# Patient Record
Sex: FEMALE | Race: OTHER
[De-identification: names, ages, dates, MRNs, and addresses within clinical notes are randomized per-mention and may not be internally consistent; named-entity substitution may affect disease eponyms.]

---

## 2017-04-03 ENCOUNTER — HOSPITAL ENCOUNTER (EMERGENCY)
Dept: HOSPITAL 76 - ED | Age: 22
Discharge: HOME | End: 2017-04-03
Payer: COMMERCIAL

## 2017-04-03 VITALS — SYSTOLIC BLOOD PRESSURE: 124 MMHG | DIASTOLIC BLOOD PRESSURE: 78 MMHG

## 2017-04-03 DIAGNOSIS — J06.9: Primary | ICD-10-CM

## 2017-04-03 DIAGNOSIS — R07.9: ICD-10-CM

## 2017-04-03 PROCEDURE — 99283 EMERGENCY DEPT VISIT LOW MDM: CPT

## 2017-04-03 PROCEDURE — 71020: CPT

## 2017-04-03 PROCEDURE — 99282 EMERGENCY DEPT VISIT SF MDM: CPT

## 2017-04-03 RX ADMIN — IBUPROFEN STA MG: 600 TABLET, FILM COATED ORAL at 18:03

## 2017-04-03 RX ADMIN — ACETAMINOPHEN STA MG: 325 TABLET ORAL at 18:02

## 2017-04-03 NOTE — XRAY REPORT
EXAM:

CHEST RADIOGRAPHY

 

EXAM DATE: 4/3/2017 06:10 PM.

 

CLINICAL HISTORY: Cough and left chest pain.

 

COMPARISON: None.

 

TECHNIQUE: 2 views.

 

FINDINGS: 

Lungs/Pleura: No focal opacities evident. No pleural effusion. No pneumothorax. Normal volumes.

 

Mediastinum: Heart and mediastinal contours are unremarkable.

 

Other: None.

 

IMPRESSION: 

1. No consolidation. 

2. No pneumothorax or effusion.

 

[facility]

Referring Provider Line: [phone]

 

SITE ID: [id number]

## 2017-04-03 NOTE — XRAY PRELIMINARY REPORT
Accession: [id number]

Exam: XR Chest 2 View PA/LAT

 

IMPRESSION: 

1. No consolidation. 

2. No pneumothorax or effusion.

 

[facility]

 

SITE ID: [id number]

## 2017-04-03 NOTE — ED PHYSICIAN DOCUMENTATION
PD HPI URI





- Stated complaint


Stated Complaint: CHEST PX,COUGH,RASH,





- Chief complaint


Chief Complaint: General





- History obtained from


History obtained from: Patient





- History of Present Illness


Timing - onset: How many weeks ago (1-2)


Timing duration: Weeks


Timing details: Gradual onset, Still present (worse past few days with chest 

pain too)


Associated symptoms: Fever, Chills, Productive cough, Chest pain, Dyspnea.  No: 

Hemoptysis, NVD


Contributing factors: No: Travel, COPD / asthma


Improves by: Rest


Worsened by: Breathing


Similar symptoms before: Has not had sx before


Recently seen: Not recently seen





Review of Systems


Constitutional: reports: Fever, Chills, Myalgias


Nose: reports: Congestion


Respiratory: reports: Dyspnea, Cough, Wheezing


GI: denies: Vomiting, Diarrhea


Skin: denies: Rash





PD PAST MEDICAL HISTORY





- Past Medical History


Past Medical History: No


Cardiovascular: None


Respiratory: None





- Present Medications


Home Medications: 


 Ambulatory Orders











 Medication  Instructions  Recorded  Confirmed


 


Albuterol Sulfate [Proair Hfa 2 puffs IH QID #1 hfa.aer.ad 04/03/17 





Inhaler]   


 


Azithromycin [Zithromax] 250 mg PO DAILY #6 tablet 04/03/17 


 


Dexamethasone [Decadron] 4 mg PO DAILY #5 tablet 04/03/17 


 


Hydrocodone/Acetaminophen [Norco 1 each PO Q6H PRN #20 tablet 04/03/17 





5-325 Tablet]   














- Allergies


Allergies/Adverse Reactions: 


 Allergies











Allergy/AdvReac Type Severity Reaction Status Date / Time


 


No Known Drug Allergies Allergy   Verified 04/03/17 16:16














PD ED PE NORMAL





- Vitals


Vital signs reviewed: Yes





- General


General: Alert and oriented X 3, No acute distress, Well developed/nourished





- HEENT


HEENT: Ears normal, Pharynx benign





- Neck


Neck: Supple, no meningeal sign, No adenopathy





- Cardiac


Cardiac: RRR, No murmur





- Respiratory


Respiratory: No: Clear bilaterally (scattered wheezes)





- Derm


Derm: Normal color, Warm and dry





- Extremities


Extremities: No edema, No calf tenderness / cord





Results





- Vitals


Vitals: 


 Vital Signs - 24 hr











  04/03/17 04/03/17





  16:13 18:29


 


Temperature 36.6 C 37.4 C


 


Heart Rate 97 80


 


Respiratory 14 16





Rate  


 


Blood Pressure 121/72 124/78


 


O2 Saturation 95 99








 Oxygen











O2 Source                      Room air

















- Rads (name of study)


  ** chest


Radiology: Prelim report reviewed, EMP read contemporaneously (normal)





PD MEDICAL DECISION MAKING





- ED course


Complexity details: considered differential (has been sick awhile and worse 

cough with sputum, consider bacterial transformation.), d/w patient





Departure





- Departure


Disposition: 01 Home, Self Care


Clinical Impression: 


Upper respiratory infection


Qualifiers:


 URI type: unspecified URI Qualified Code(s): J06.9 - Acute upper respiratory 

infection, unspecified





Chest pain


Qualifiers:


 Chest pain type: chest pain on breathing Qualified Code(s): R07.1 - Chest pain 

on breathing


Condition: Stable


Record reviewed to determine appropriate education?: Yes


Instructions:  ED Chest Pain Pleurisy, ED Upper Resp Infec Abx Tx


Follow-Up: 


SERA GREENBERG [Primary Care Provider] - 


Prescriptions: 


Dexamethasone [Decadron] 4 mg PO DAILY #5 tablet


Hydrocodone/Acetaminophen [Norco 5-325 Tablet] 1 each PO Q6H PRN #20 tablet


 PRN Reason: Pain


Albuterol Sulfate [Proair Hfa Inhaler] 2 puffs IH QID #1 hfa.aer.ad


Azithromycin [Zithromax] 250 mg PO DAILY #6 tablet


Comments: 


Your chest xray looks okay, so no pneumonia, collapsed lung, etc. Sounds like 

bronchitis and some inflammation of the chestwall/lung surface. Zithromax and 

Decadron daily for 5 days. Use Albuterol inhaler 2 puffs 4 times daily for 7-10 

days. Add hydrocodone as needed for pains. Recheck if not improved over the 

next 3-5 days. 


Forms:  Activity restrictions


Discharge Date/Time: 04/03/17 18:58

## 2017-05-16 ENCOUNTER — HOSPITAL ENCOUNTER (EMERGENCY)
Dept: HOSPITAL 76 - ED | Age: 22
Discharge: HOME | End: 2017-05-16
Payer: COMMERCIAL

## 2017-05-16 VITALS — DIASTOLIC BLOOD PRESSURE: 76 MMHG | SYSTOLIC BLOOD PRESSURE: 120 MMHG

## 2017-05-16 DIAGNOSIS — F17.200: ICD-10-CM

## 2017-05-16 DIAGNOSIS — R14.0: Primary | ICD-10-CM

## 2017-05-16 DIAGNOSIS — R03.0: ICD-10-CM

## 2017-05-16 DIAGNOSIS — R53.83: ICD-10-CM

## 2017-05-16 LAB
ALBUMIN/GLOB SERPL: 1.2 {RATIO} (ref 1–2.2)
ANION GAP SERPL CALCULATED.4IONS-SCNC: 8 MMOL/L (ref 6–13)
BASOPHILS NFR BLD AUTO: 0 10^3/UL (ref 0–0.1)
BASOPHILS NFR BLD AUTO: 0.7 %
BILIRUB BLD-MCNC: 0.4 MG/DL (ref 0.2–1)
BUN SERPL-MCNC: 11 MG/DL (ref 6–20)
CALCIUM UR-MCNC: 9.5 MG/DL (ref 8.5–10.3)
CHLORIDE SERPL-SCNC: 103 MMOL/L (ref 101–111)
CO2 SERPL-SCNC: 27 MMOL/L (ref 21–32)
CREAT SERPLBLD-SCNC: 0.6 MG/DL (ref 0.4–1)
CUL URINE ADD CHARGE: (no result)
EOSINOPHIL # BLD AUTO: 0 10^3/UL (ref 0–0.7)
EOSINOPHIL NFR BLD AUTO: 0.7 %
ERYTHROCYTE [DISTWIDTH] IN BLOOD BY AUTOMATED COUNT: 13.5 % (ref 12–15)
GFRSERPLBLD MDRD-ARVRAT: 126 ML/MIN/{1.73_M2} (ref 89–?)
GLOBULIN SER-MCNC: 3.7 G/DL (ref 2.1–4.2)
GLUCOSE SERPL-MCNC: 102 MG/DL (ref 70–100)
HCG UR QL: NEGATIVE
HCT VFR BLD AUTO: 42.7 % (ref 37–47)
HGB UR QL STRIP: 14.1 G/DL (ref 12–16)
HPYLORI POS QC: POSITIVE
LIPASE SERPL-CCNC: 32 U/L (ref 22–51)
LYMPHOCYTES # SPEC AUTO: 2.3 10^3/UL (ref 1.5–3.5)
LYMPHOCYTES NFR BLD AUTO: 35.3 %
MCH RBC QN AUTO: 32.3 PG (ref 27–31)
MCHC RBC AUTO-ENTMCNC: 33 G/DL (ref 32–36)
MCV RBC AUTO: 97.9 FL (ref 81–99)
MONOCYTES # BLD AUTO: 0.5 10^3/UL (ref 0–1)
MONOCYTES NFR BLD AUTO: 7.1 %
NEUTROPHILS # BLD AUTO: 3.6 10^3/UL (ref 1.5–6.6)
NEUTROPHILS # SNV AUTO: 6.5 X10^3/UL (ref 4.8–10.8)
NEUTROPHILS NFR BLD AUTO: 56.2 %
NRBC # BLD AUTO: 0 /100WBC
PDW BLD AUTO: 9.3 FL (ref 7.9–10.8)
PH UR STRIP.AUTO: 6.5 PH (ref 5–7.5)
POTASSIUM SERPL-SCNC: 4.1 MMOL/L (ref 3.5–5)
PROT SPEC-MCNC: 8.2 G/DL (ref 6.7–8.2)
RBC MAR: 4.36 10^6/UL (ref 4.2–5.4)
SODIUM SERPLBLD-SCNC: 138 MMOL/L (ref 135–145)
SP GR UR STRIP.AUTO: 1.01 (ref 1–1.03)
SP GR UR STRIP.AUTO: 1.01 (ref 1–1.03)
UA CHARGE (STRIP ONLY): (no result)
UA W/ MICROSCOPIC CHARGE: YES
UR CULTURE IF IND: (no result)
UROBILINOGEN UR STRIP.AUTO-MCNC: NEGATIVE MG/DL
WBC # BLD: 6.5 X10^3/UL
WBC # UR MANUAL: (no result) /HPF (ref 0–5)

## 2017-05-16 PROCEDURE — 81003 URINALYSIS AUTO W/O SCOPE: CPT

## 2017-05-16 PROCEDURE — 87077 CULTURE AEROBIC IDENTIFY: CPT

## 2017-05-16 PROCEDURE — 81025 URINE PREGNANCY TEST: CPT

## 2017-05-16 PROCEDURE — 85025 COMPLETE CBC W/AUTO DIFF WBC: CPT

## 2017-05-16 PROCEDURE — 87086 URINE CULTURE/COLONY COUNT: CPT

## 2017-05-16 PROCEDURE — 80053 COMPREHEN METABOLIC PANEL: CPT

## 2017-05-16 PROCEDURE — 83690 ASSAY OF LIPASE: CPT

## 2017-05-16 PROCEDURE — 99283 EMERGENCY DEPT VISIT LOW MDM: CPT

## 2017-05-16 PROCEDURE — 81001 URINALYSIS AUTO W/SCOPE: CPT

## 2017-05-16 PROCEDURE — 87339 H PYLORI AG IA: CPT

## 2017-05-16 NOTE — ED PHYSICIAN DOCUMENTATION
History of Present Illness





- Stated complaint


Stated Complaint: DIZZINESS,FATIGUE





- Chief complaint


Chief Complaint: General





- History obtained from


History obtained from: Patient





- History of Present Illness


Timing: Other (21-year-old who for the last many months has had postprandial 

bloating and sometimes vomiting associated with fatigue despite good sleep.  

Already saw her flight surgeon a week ago and had blood work done, the results 

are unknown.  She denies weight loss, changes in bowel movements, possibility 

of pregnancy (she is on Depo-Provera).)





Review of Systems


Constitutional: reports: Fatigue.  denies: Fever, Chills, Myalgias, Weight Loss

, Sweats


Eyes: denies: Loss of vision, Decreased vision


Nose: denies: Rhinorrhea / runny nose, Congestion


Cardiac: denies: Chest pain / pressure, Palpitations


Respiratory: denies: Dyspnea, Cough





PD PAST MEDICAL HISTORY





- Past Medical History


Past Medical History: No


Cardiovascular: None


Respiratory: None





- Past Surgical History


Past Surgical History: No





- Present Medications


Home Medications: 


 Ambulatory Orders











 Medication  Instructions  Recorded  Confirmed


 


Omeprazole [PriLOSEC] 20 mg PO DAILY #14 capsule 05/16/17 














- Allergies


Allergies/Adverse Reactions: 


 Allergies











Allergy/AdvReac Type Severity Reaction Status Date / Time


 


No Known Drug Allergies Allergy   Verified 05/16/17 19:16














- Social History


Does the pt smoke?: Yes


Smoking Status: Current some day smoker


Does the pt drink ETOH?: No


Does the pt have substance abuse?: No





- Immunizations


Immunizations are current?: Yes





- POLST


Patient has POLST: No





PD ED PE NORMAL





- Vitals


Vital signs reviewed: Yes





- General


General: Alert and oriented X 3, No acute distress





- HEENT


HEENT: PERRL, EOMI





- Neck


Neck: Supple, no meningeal sign, No bony TTP





- Cardiac


Cardiac: RRR, No murmur





- Respiratory


Respiratory: No respiratory distress, Clear bilaterally





- Abdomen


Abdomen: Normal bowel sounds, Soft, Non tender





- Back


Back: No CVA TTP, No spinal TTP





- Extremities


Extremities: No edema, No calf tenderness / cord





- Neuro


Neuro: Alert and oriented X 3, Normal speech





- Psych


Psych: Normal mood, Normal affect





Results





- Vitals


Vitals: 


 Vital Signs - 24 hr











  05/16/17





  19:10


 


Temperature 36.4 C L


 


Heart Rate 92


 


Respiratory 16





Rate 


 


Blood Pressure 142/81 H


 


O2 Saturation 98








 Oxygen











O2 Source                      Room air

















- Labs


Labs: 


 Laboratory Tests











  05/16/17 05/16/17 05/16/17





  19:56 19:56 19:56


 


WBC  6.5  


 


RBC  4.36  


 


Hgb  14.1  


 


Hct  42.7  


 


MCV  97.9  


 


MCH  32.3 H  


 


MCHC  33.0  


 


RDW  13.5  


 


Plt Count  241  


 


MPV  9.3  


 


Neut #  3.6  


 


Lymph #  2.3  


 


Mono #  0.5  


 


Eos #  0.0  


 


Baso #  0.0  


 


Absolute Nucleated RBC  0.00  


 


Nucleated RBCs  0.0  


 


Sodium    138


 


Potassium    4.1


 


Chloride    103


 


Carbon Dioxide    27


 


Anion Gap    8.0


 


BUN    11


 


Creatinine    0.6


 


Estimated GFR (MDRD)    126


 


Glucose    102 H


 


Calcium    9.5


 


Total Bilirubin    0.4


 


AST    43 H


 


ALT    29


 


Alkaline Phosphatase    82


 


Total Protein    8.2


 


Albumin    4.5


 


Globulin    3.7


 


Albumin/Globulin Ratio    1.2


 


Lipase    32


 


Urine Color   


 


Urine Clarity   


 


Urine pH   


 


Ur Specific Gravity   


 


Urine Protein   


 


Urine Glucose (UA)   


 


Urine Ketones   


 


Urine Occult Blood   


 


Urine Nitrite   


 


Urine Bilirubin   


 


Urine Urobilinogen   


 


Ur Leukocyte Esterase   


 


Urine RBC   


 


Urine WBC   


 


Ur Squamous Epith Cells   


 


Urine Bacteria   


 


Urine Mucus   


 


Ur Microscopic Review   


 


Urine Culture Comments   


 


Urine HCG, Qual   


 


H. pylori IgG Antibody   Negative 














  05/16/17 05/16/17





  20:31 20:31


 


WBC  


 


RBC  


 


Hgb  


 


Hct  


 


MCV  


 


MCH  


 


MCHC  


 


RDW  


 


Plt Count  


 


MPV  


 


Neut #  


 


Lymph #  


 


Mono #  


 


Eos #  


 


Baso #  


 


Absolute Nucleated RBC  


 


Nucleated RBCs  


 


Sodium  


 


Potassium  


 


Chloride  


 


Carbon Dioxide  


 


Anion Gap  


 


BUN  


 


Creatinine  


 


Estimated GFR (MDRD)  


 


Glucose  


 


Calcium  


 


Total Bilirubin  


 


AST  


 


ALT  


 


Alkaline Phosphatase  


 


Total Protein  


 


Albumin  


 


Globulin  


 


Albumin/Globulin Ratio  


 


Lipase  


 


Urine Color   YELLOW


 


Urine Clarity   CLEAR


 


Urine pH   6.5


 


Ur Specific [place]  1.015  1.015


 


Urine Protein   NEGATIVE


 


Urine Glucose (UA)   NEGATIVE


 


Urine Ketones   NEGATIVE


 


Urine Occult Blood   NEGATIVE


 


Urine Nitrite   NEGATIVE


 


Urine Bilirubin   NEGATIVE


 


Urine Urobilinogen   0.2 (NORMAL)


 


Ur Leukocyte Esterase   SMALL H


 


Urine RBC   0-5


 


Urine WBC   4-5


 


Ur Squamous Epith Cells   FEW Squamous


 


Urine Bacteria   Few


 


Urine Mucus   Few Strands


 


Ur Microscopic Review   INDICATED


 


Urine Culture Comments   INDICATED


 


Urine HCG, Qual  NEGATIVE 


 


H. pylori IgG Antibody  














PD MEDICAL DECISION MAKING





- ED course


ED course: 





She presents with long-standing fatigue and abdominal bloating after eating.  

She has a normal examination, she is not losing weight.  We will trial of PPI, 

she is to follow up with her physician.





Departure





- Departure


Disposition: 01 Home, Self Care


Clinical Impression: 


 Postprandial abdominal bloating





Fatigue


Qualifiers:


 Fatigue type: unspecified Qualified Code(s): R53.83 - Other fatigue


Condition: Good


Record reviewed to determine appropriate education?: Yes


Instructions:  ED Abdominal Pain Unkn Cause


Prescriptions: 


Omeprazole [PriLOSEC] 20 mg PO DAILY #14 capsule


Comments: 


Call your doctor to arrange a follow up appointment. Make the next available 

appointment. In the interim return anytime if worse or if new symptoms develop.





Your blood pressure was elevated today on check in to the emergency department. 

This does not mean that you have hypertension, it is a common phenomenon to 

check into the emergency department and have elevated blood pressure. I 

recommend that you see your primary care physician within the week to have it 

rechecked when you're feeling better.

## 2017-06-10 ENCOUNTER — HOSPITAL ENCOUNTER (EMERGENCY)
Dept: HOSPITAL 76 - ED | Age: 22
Discharge: HOME | End: 2017-06-10
Payer: COMMERCIAL

## 2017-06-10 VITALS — DIASTOLIC BLOOD PRESSURE: 78 MMHG | SYSTOLIC BLOOD PRESSURE: 138 MMHG

## 2017-06-10 DIAGNOSIS — B08.1: Primary | ICD-10-CM

## 2017-06-10 DIAGNOSIS — F17.200: ICD-10-CM

## 2017-06-10 DIAGNOSIS — R03.0: ICD-10-CM

## 2017-06-10 PROCEDURE — 86694 HERPES SIMPLEX NES ANTBDY: CPT

## 2017-06-10 PROCEDURE — 99283 EMERGENCY DEPT VISIT LOW MDM: CPT

## 2017-06-10 PROCEDURE — 86696 HERPES SIMPLEX TYPE 2 TEST: CPT

## 2017-06-10 PROCEDURE — 86695 HERPES SIMPLEX TYPE 1 TEST: CPT

## 2017-06-10 NOTE — ED PHYSICIAN DOCUMENTATION
PD HPI SKIN





- Stated complaint


Stated Complaint: BUMPS





- Chief complaint


Chief Complaint: Wound





- History obtained from


History obtained from: Patient





- History of Present Illness


Timing - onset: Other (For the last 5 months this 21-year-old woman who is 

sexually active with a single partner has had non-painful nonmigratory lumps in 

her groin.  She saw her physician, she says STD testing was done and negative.  

Some of them were treated with liquid nitrogen without success.  She is here 

today hoping for potentially a skin biopsy, there is no acute complaint.)





Review of Systems


Constitutional: denies: Fever, Chills


GI: denies: Abdominal Pain, Vomiting, Diarrhea


: denies: Now pregnant EGA





PD PAST MEDICAL HISTORY





- Past Medical History


Past Medical History: Yes


Cardiovascular: None


Respiratory: None


Neuro: Headache/migraine





- Past Surgical History


Past Surgical History: Yes





- Present Medications


Home Medications: 


 Ambulatory Orders











 Medication  Instructions  Recorded  Confirmed


 


Omeprazole [PriLOSEC] 20 mg PO DAILY #14 capsule 05/16/17 














- Allergies


Allergies/Adverse Reactions: 


 Allergies











Allergy/AdvReac Type Severity Reaction Status Date / Time


 


No Known Drug Allergies Allergy   Verified 05/16/17 19:16














- Social History


Does the pt smoke?: Yes


Smoking Status: Current some day smoker


Does the pt drink ETOH?: Yes


Does the pt have substance abuse?: No





- Immunizations


Immunizations are current?: Yes





- POLST


Patient has POLST: No





PD ED PE NORMAL





- Vitals


Vital signs reviewed: Yes





- General


General: Alert and oriented X 3, No acute distress





- Abdomen


Abdomen: Soft, Non tender





- Female 


Female : Other (Some lesions on the skin c/w molluscum)





- Neuro


Neuro: Alert and oriented X 3, Normal speech





- Psych


Psych: Normal mood, Normal affect





Results





- Vitals


Vitals: 


 Vital Signs - 24 hr











  06/10/17





  12:35


 


Temperature 37.2 C


 


Heart Rate 92


 


Respiratory 18





Rate 


 


Blood Pressure 138/78 H


 


O2 Saturation 98








 Oxygen











O2 Source                      Room air

















PD MEDICAL DECISION MAKING





- ED course


ED course: 





Lesions are very consistent with molluscum contagiosum with a central core that 

is expressible.  The benign nature of this diagnosis was discussed with her.  

She requested herpes testing, discussed that she would need to follow up with 

her physician for results as they are not quick to results.





Departure





- Departure


Disposition: 01 Home, Self Care


Clinical Impression: 


 Molluscum contagiosum


Condition: Good


Record reviewed to determine appropriate education?: Yes


Instructions:  ED Molluscum Contagiosum


Comments: 


Followup with your physician in about a week to get results of herpes testing,





Your blood pressure was elevated today on check in to the emergency department. 

This does not mean that you have hypertension, it is a common phenomenon to 

check into the emergency department and have elevated blood pressure. I 

recommend that you see your primary care physician within the week to have it 

rechecked when you're feeling better.

## 2017-06-14 LAB
C TRACH RRNA SPEC QL NAA+PROBE: NEGATIVE
HSV 1 IGG INDEX: 17.1 INDEX
HSV 1 IGG INTERPRETATION: (no result)
HSV 1/2 IGM INDEX: 1.25 INDEX
HSV 1/2 IGM INTERPRETATION: (no result)
HSV 2 IGG INDEX: <0.9 INDEX
HSV 2 IGG INTERPRETATION: (no result)

## 2018-06-06 ENCOUNTER — HOSPITAL ENCOUNTER (EMERGENCY)
Dept: HOSPITAL 76 - ED | Age: 23
Discharge: HOME | End: 2018-06-06
Payer: COMMERCIAL

## 2018-06-06 VITALS — SYSTOLIC BLOOD PRESSURE: 112 MMHG | DIASTOLIC BLOOD PRESSURE: 74 MMHG

## 2018-06-06 DIAGNOSIS — F17.200: ICD-10-CM

## 2018-06-06 DIAGNOSIS — N39.0: ICD-10-CM

## 2018-06-06 DIAGNOSIS — E86.0: Primary | ICD-10-CM

## 2018-06-06 LAB
CLARITY UR REFRACT.AUTO: (no result)
GLUCOSE UR QL STRIP.AUTO: NEGATIVE MG/DL
HCG UR QL: NEGATIVE
KETONES UR QL STRIP.AUTO: 15 MG/DL
NITRITE UR QL STRIP.AUTO: NEGATIVE
PH UR STRIP.AUTO: 6 PH (ref 5–7.5)
PROT UR STRIP.AUTO-MCNC: NEGATIVE MG/DL
RBC # UR STRIP.AUTO: NEGATIVE /UL
SP GR UR STRIP.AUTO: 1.02 (ref 1–1.03)
SQUAMOUS URNS QL MICRO: (no result)
UROBILINOGEN UR QL STRIP.AUTO: (no result) E.U./DL
UROBILINOGEN UR STRIP.AUTO-MCNC: NEGATIVE MG/DL

## 2018-06-06 PROCEDURE — 81003 URINALYSIS AUTO W/O SCOPE: CPT

## 2018-06-06 PROCEDURE — 87077 CULTURE AEROBIC IDENTIFY: CPT

## 2018-06-06 PROCEDURE — 81001 URINALYSIS AUTO W/SCOPE: CPT

## 2018-06-06 PROCEDURE — 99283 EMERGENCY DEPT VISIT LOW MDM: CPT

## 2018-06-06 PROCEDURE — 99284 EMERGENCY DEPT VISIT MOD MDM: CPT

## 2018-06-06 PROCEDURE — 82375 ASSAY CARBOXYHB QUANT: CPT

## 2018-06-06 PROCEDURE — 87086 URINE CULTURE/COLONY COUNT: CPT

## 2018-06-06 PROCEDURE — 81025 URINE PREGNANCY TEST: CPT

## 2018-06-06 PROCEDURE — 87181 SC STD AGAR DILUTION PER AGT: CPT

## 2018-06-06 NOTE — ED PHYSICIAN DOCUMENTATION
PD HPI HEADACHE





- Stated complaint


Stated Complaint: LIGHTHEADED





- Chief complaint


Chief Complaint: General





- History obtained from


History obtained from: Patient, Family





- History of Present Illness


Timing - onset: Today


Timing - details: Gradual onset, Still present


Associated symptoms: No: Nausea, Vomiting, Syncope


Contributing factors: Possible carbon monoxide


Similar symptoms before: Has not had sx before


Recently seen: Not recently seen





- Additional information


Additional information: 





Patient is a 22 year old female with no significant past medical history who is 

presenting to the emergency department for headache and lightheadedness.  

According to patient and family it started today.  Patient stated that the CO 

monitor was reading red (vs. green).  Patient had been exposed for about 4 

hours.  Patient's  had been in the house for 5 hours and was 

asymptomatic.  





Review of Systems


Constitutional: denies: Fever, Chills


Eyes: denies: Loss of vision, Decreased vision, Photophobia


Cardiac: denies: Chest pain / pressure


Respiratory: denies: Dyspnea


GI: denies: Nausea, Vomiting


: denies: Dysuria, Frequency


Skin: denies: Rash, Lesions


Musculoskeletal: denies: Neck pain


Neurologic: reports: Headache.  denies: Generalized weakness, Focal weakness, 

Numbness, Syncope, Seizure, Altered mental status, LOC


Immunocompromised: denies: Immunocompromised





PD PAST MEDICAL HISTORY





- Past Medical History


Cardiovascular: None


Respiratory: None





- Past Surgical History


Past Surgical History: Yes





- Present Medications


Home Medications: 


 Ambulatory Orders











 Medication  Instructions  Recorded  Confirmed


 


Norethindrone-Ethinyl Estrad 1 tab PO DAILY 06/06/18 06/06/18





[Cyclafem 1-35-28 Tablet]   


 


Sulfamethox/Trimeth 800/160 1 each PO BID #14 tablet 06/06/18 





[Bactrim Ds 800/160]   














- Allergies


Allergies/Adverse Reactions: 


 Allergies











Allergy/AdvReac Type Severity Reaction Status Date / Time


 


No Known Drug Allergies Allergy   Verified 06/06/18 21:43














- Social History


Does the pt smoke?: Yes


Smoking Status: Current some day smoker


Does the pt drink ETOH?: Yes


Does the pt have substance abuse?: No





- Immunizations


Immunizations are current?: Yes





- POLST


Patient has POLST: No





PD ED PE NORMAL





- Vitals


Vital signs reviewed: Yes





- General


General: Alert and oriented X 3, No acute distress, Well developed/nourished





- HEENT


HEENT: Atraumatic, PERRL





- Neck


Neck: Supple, no meningeal sign





- Cardiac


Cardiac: RRR





- Respiratory


Respiratory: No respiratory distress





- Abdomen


Abdomen: Non distended





- Derm


Derm: Normal color, Warm and dry





- Extremities


Extremities: No deformity





- Neuro


Neuro: Alert and oriented X 3, No motor deficit, Normal speech


Eye Opening: Spontaneous


Motor: Obeys Commands


Verbal: Oriented


GCS Score: 15





- Psych


Psych: Normal mood





Results





- Vitals


Vitals: 


 Vital Signs - 24 hr











  06/06/18





  21:39


 


Temperature 36.4 C L


 


Heart Rate 84


 


Respiratory 18





Rate 


 


Blood Pressure 122/71


 


O2 Saturation 100








 Oxygen











O2 Source                      Room air


 


Oxygen Flow Rate               2

















- Labs


Labs: 


 Laboratory Tests











  06/06/18





  21:50


 


Urine Color  YELLOW


 


Urine Clarity  HAZY


 


Urine pH  6.0


 


Ur Specific Gravity  1.020


 


Urine Protein  NEGATIVE


 


Urine Glucose (UA)  NEGATIVE


 


Urine Ketones  15 H


 


Urine Occult Blood  NEGATIVE


 


Urine Nitrite  NEGATIVE


 


Urine Bilirubin  NEGATIVE


 


Urine Urobilinogen  0.2 (NORMAL)


 


Ur Leukocyte Esterase  SMALL H


 


Urine RBC  11-25 H


 


Urine WBC  11-25 H


 


Ur Squamous Epith Cells  FEW Squamous


 


Urine Bacteria  Few


 


Ur Microscopic Review  INDICATED


 


Urine Culture Comments  INDICATED


 


Urine HCG, Qual  NEGATIVE














PD MEDICAL DECISION MAKING





- ED course


Complexity details: reviewed old records, reviewed results, re-evaluated patient

, considered differential, d/w patient, d/w family


ED course: 





Patient was seen and examined at bedside.  patient's urine was collected and 

patient was placed on supplemental oxygen.  Patient's urine was consistent with 

a urinary tract infection and dehydration.  patient stated that she was scared 

of needles and did not want a blood draw.  Further discussion with the family 

revealed that the CO monitor had been green most of the time and had only 

flickered red a few times.  Patient's  who had been in the house longer 

than the patient had no symptoms.  Patient was not pregnant.  CO poisoning was 

unlikely and patient was stable for discharge with outpatient follow up.    





Departure





- Departure


Disposition: 01 Home, Self Care


Clinical Impression: 


 Urinary tract infection, Dehydration





Condition: Good


Instructions:  ED UTI Cystitis Female, ED Dehydration


Follow-Up: 


CRISTOFER DALLAS III, MD [Primary Care Provider] - 


Prescriptions: 


Sulfamethox/Trimeth 800/160 [Bactrim Ds 800/160] 1 each PO BID #14 tablet


Comments: 


Your symptoms today are being caused by a urinary tract infection and 

dehydration.  It is important that you increase your fluid intake (mainly water

) to 80oz a day for the next few days.  You should follow up with your doctor 

if your symptoms persist.  you may return to the emergency department at any 

time for new, worsening or uncontrollable symptoms.

## 2018-10-31 ENCOUNTER — HOSPITAL ENCOUNTER (OUTPATIENT)
Dept: HOSPITAL 76 - DI | Age: 23
Discharge: HOME | End: 2018-10-31
Attending: GENERAL PRACTICE
Payer: COMMERCIAL

## 2018-10-31 DIAGNOSIS — M67.911: Primary | ICD-10-CM

## 2018-11-01 NOTE — MRI REPORT
Reason:  PAIN IN RIGHT SHOULDER

Procedure Date:  10/31/2018   

Accession Number:  [id number] / [id number]                    

Procedure:  MRI - Shoulder RT W/O CPT Code:  

 

FULL RESULT:

 

 

EXAM:

RIGHT SHOULDER MRI WITHOUT CONTRAST

 

EXAM DATE: 10/31/2018 03:43 PM.

 

CLINICAL HISTORY: PAIN IN RIGHT SHOULDER.

 

COMPARISON: None.

 

TECHNIQUE: Multiplanar, multisequence T1-weighted and fluid-sensitive 

sequences of the shoulder without contrast. Other: None.

 

FINDINGS:

Acromioclavicular Region: The acromion is type II. The acromioclavicular 

joint is unremarkable. The coracoacromial and coracoclavicular ligaments 

are intact. No subacromial/subdeltoid bursal fluid.

 

Glenohumeral Region: No subluxation. No effusion or loose bodies. The 

articular cartilage is unremarkable. The glenohumeral ligaments and joint 

capsule are unremarkable.

 

Bone Marrow: No fracture, marrow edema or bone lesions.

 

Labrum: The labrum is unremarkable on this nonarthrographic study.

 

Musculature/Rotator Cuff: Mild supraspinatus tendinosis. No tear. The 

infraspinatus, teres minor, and subscapularis tendons are normal. No 

edema or fatty atrophy.

 

Biceps Tendon: The long head of the biceps tendon and biceps pulley are 

intact.

 

Other: The subcutaneous tissues are unremarkable.

IMPRESSION:

1. Mild supraspinatus tendinosis without tear.

2. Normal labrum.

3. Normal biceps tendon.

 

[facility] MUSCULOSKELETAL RADIOLOGY SECTION

## 2018-12-27 ENCOUNTER — HOSPITAL ENCOUNTER (EMERGENCY)
Dept: HOSPITAL 76 - ED | Age: 23
Discharge: HOME | End: 2018-12-27
Payer: COMMERCIAL

## 2018-12-27 VITALS — SYSTOLIC BLOOD PRESSURE: 125 MMHG | DIASTOLIC BLOOD PRESSURE: 84 MMHG

## 2018-12-27 DIAGNOSIS — K29.00: ICD-10-CM

## 2018-12-27 DIAGNOSIS — N30.00: Primary | ICD-10-CM

## 2018-12-27 DIAGNOSIS — F17.200: ICD-10-CM

## 2018-12-27 LAB
CLARITY UR REFRACT.AUTO: (no result)
GLUCOSE UR QL STRIP.AUTO: NEGATIVE MG/DL
HCG UR QL: NEGATIVE
KETONES UR QL STRIP.AUTO: NEGATIVE MG/DL
NITRITE UR QL STRIP.AUTO: POSITIVE
PH UR STRIP.AUTO: 6.5 PH (ref 5–7.5)
PROT UR STRIP.AUTO-MCNC: NEGATIVE MG/DL
RBC # UR STRIP.AUTO: (no result) /UL
RBC # URNS HPF: (no result) /HPF (ref 0–5)
SP GR UR STRIP.AUTO: 1.01 (ref 1–1.03)
SQUAMOUS URNS QL MICRO: (no result)
UROBILINOGEN UR QL STRIP.AUTO: (no result) E.U./DL
UROBILINOGEN UR STRIP.AUTO-MCNC: NEGATIVE MG/DL

## 2018-12-27 PROCEDURE — 83690 ASSAY OF LIPASE: CPT

## 2018-12-27 PROCEDURE — 80053 COMPREHEN METABOLIC PANEL: CPT

## 2018-12-27 PROCEDURE — 99283 EMERGENCY DEPT VISIT LOW MDM: CPT

## 2018-12-27 PROCEDURE — 81025 URINE PREGNANCY TEST: CPT

## 2018-12-27 PROCEDURE — 81003 URINALYSIS AUTO W/O SCOPE: CPT

## 2018-12-27 PROCEDURE — 87086 URINE CULTURE/COLONY COUNT: CPT

## 2018-12-27 PROCEDURE — 87181 SC STD AGAR DILUTION PER AGT: CPT

## 2018-12-27 PROCEDURE — 85025 COMPLETE CBC W/AUTO DIFF WBC: CPT

## 2018-12-27 PROCEDURE — 81001 URINALYSIS AUTO W/SCOPE: CPT

## 2018-12-27 NOTE — ED PHYSICIAN DOCUMENTATION
PD HPI FEMALE 





- Stated complaint


Stated Complaint: ABD PX





- Chief complaint


Chief Complaint: Abd Pain





- History obtained from


History obtained from: Patient





- History of Present Illness


Timing - onset: How many days ago (3)


Timing - duration: Days (3)


Timing - details: Gradual onset, Still present


Associated symptoms: Abdominal pain


Contributing factors: No: Pregnant


Similar symptoms before: Has not had sx before


Recently seen: Not recently seen





- Additional information


Additional information: 


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 

and nausea without vomiting without diarrhea about 3 days ago.  She relates that

she ate some food on Tai day that she thought might be undercooked and she

thinks that she may have some cramping associated with that.  She also knows 

that she drank quite heavily and she has some abdominal pain related to that.  

She relates 2 separate pains.  She denies any urinary symptoms at all and denies

any back pain or fever.








Review of Systems


Constitutional: denies: Fever, Chills, Myalgias, Fatigue


Eyes: denies: Decreased vision


Ears: denies: Ear pain


Nose: denies: Rhinorrhea / runny nose, Congestion


Throat: denies: Sore throat


Cardiac: denies: Chest pain / pressure, Palpitations


Respiratory: denies: Dyspnea, Cough


GI: reports: Abdominal Pain, Nausea.  denies: Vomiting, Constipation, Diarrhea


: denies: Dysuria, Frequency


Skin: denies: Rash


Musculoskeletal: denies: Neck pain, Back pain, Extremity pain


Neurologic: denies: Generalized weakness, Focal weakness, Numbness





PD PAST MEDICAL HISTORY





- Past Medical History


Cardiovascular: None


Respiratory: None





- Past Surgical History


Past Surgical History: Yes





- Present Medications


Home Medications: 


                                Ambulatory Orders











 Medication  Instructions  Recorded  Confirmed


 


Norethindrone-Ethinyl Estrad 1 tab PO DAILY 06/06/18 06/06/18





[Cyclafem 1-35-28 Tablet]   


 


Sulfamethoxazole/Trimethoprim 1 each PO BID #10 tablet 12/27/18 





[Sulfamethoxazole-Tmp Ds Tablet]   














- Allergies


Allergies/Adverse Reactions: 


                                    Allergies











Allergy/AdvReac Type Severity Reaction Status Date / Time


 


No Known Drug Allergies Allergy   Verified 12/27/18 20:40














- Social History


Does the pt smoke?: Yes


Smoking Status: Current some day smoker


Does the pt drink ETOH?: Yes


Does the pt have substance abuse?: No





- Immunizations


Immunizations are current?: Yes





- POLST


Patient has POLST: No





PD ED PE NORMAL





- Vitals


Vital signs reviewed: Yes (normal )





- General


General: Alert and oriented X 3, No acute distress, Well developed/nourished, 

Other (appears quite well )





- HEENT


HEENT: Atraumatic, PERRL, EOMI





- Neck


Neck: Supple, no meningeal sign, No bony TTP





- Cardiac


Cardiac: RRR, No murmur





- Respiratory


Respiratory: No respiratory distress, Clear bilaterally





- Abdomen


Abdomen: Soft, Other (mild left upper quadrant tenderness without garding or 

rebound and suprpubic tenderness mild )





- Back


Back: No CVA TTP, No spinal TTP





- Derm


Derm: Normal color, Warm and dry, No rash





- Extremities


Extremities: No deformity, No edema





- Neuro


Neuro: Alert and oriented X 3, CNs 2-12 intact, No motor deficit, No sensory 

deficit, Normal speech


Eye Opening: Spontaneous


Motor: Obeys Commands


Verbal: Oriented


GCS Score: 15





- Psych


Psych: Normal mood, Normal affect





Results





- Vitals


Vitals: 


                               Vital Signs - 24 hr











  12/27/18





  20:39


 


Temperature 36.7 C


 


Heart Rate 77


 


Respiratory 14





Rate 


 


Blood Pressure 117/80


 


O2 Saturation 100








                                     Oxygen











O2 Source                      Room air

















- Labs


Labs: 


                                Laboratory Tests











  12/27/18





  20:47


 


Urine Color  YELLOW


 


Urine Clarity  CLOUDY


 


Urine pH  6.5


 


Ur Specific Gravity  1.015


 


Urine Protein  NEGATIVE


 


Urine Glucose (UA)  NEGATIVE


 


Urine Ketones  NEGATIVE


 


Urine Occult Blood  TRACE-LYSE


 


Urine Nitrite  POSITIVE H


 


Urine Bilirubin  NEGATIVE


 


Urine Urobilinogen  0.2 (NORMAL)


 


Ur Leukocyte Esterase  SMALL H


 


Urine RBC  0-5


 


Urine WBC  >25 H


 


Ur Squamous Epith Cells  FEW Squamous


 


Urine Bacteria  Moderate H


 


Ur Microscopic Review  INDICATED


 


Urine Culture Comments  INDICATED


 


Urine HCG, Qual  NEGATIVE














PD MEDICAL DECISION MAKING





- ED course


Complexity details: reviewed results, re-evaluated patient, considered 

differential, d/w patient


ED course: 





23-year-old previously well female with symptoms of gastritis likely related to 

alcohol intake appears well on physical exam.  She has obvious urinary tract 

infection on examination of the urine and she does not have much in the way of 

symptoms other than some lower abdominal cramping.  I suspect she has 2 

processes simultaneously and the patient confirms this is likely.  She is 

administered Mylanta 30 mg orally and instructed to obtain some medication for 

gastritis over-the-counter.  She is also given sulfamethoxazole trimethoprim for

the urinary tract infection.





Departure





- Departure


Disposition: 01 Home, Self Care


Clinical Impression: 


Urinary tract infection


Qualifiers:


 Urinary tract infection type: acute cystitis Hematuria presence: without 

hematuria Qualified Code(s): N30.00 - Acute cystitis without hematuria





Gastritis


Qualifiers:


 Gastritis type: unspecified gastritis Chronicity: acute Gastritis bleeding: 

without bleeding Qualified Code(s): K29.00 - Acute gastritis without bleeding





Instructions:  ED Gastritis, ED UTI Cystitis Female


Follow-Up: 


CRISTOFER DALLAS III, MD [Primary Care Provider] - 


Prescriptions: 


Sulfamethoxazole/Trimethoprim [Sulfamethoxazole-Tmp Ds Tablet] 1 each PO BID #10

tablet


Comments: 


Today it seems to have 2 separate problems causing you some distress in your 

abdomen.  My recommendation is to obtain some medication over-the-counter for 

reduction of acid in your stomach for 3-7 days.  In addition drink extra fluids 

and take the antibiotic to resolve the urinary tract infection.


Forms:  Activity restrictions

## 2019-07-15 ENCOUNTER — HOSPITAL ENCOUNTER (EMERGENCY)
Dept: HOSPITAL 76 - ED | Age: 24
Discharge: HOME | End: 2019-07-15
Payer: COMMERCIAL

## 2019-07-15 VITALS — DIASTOLIC BLOOD PRESSURE: 77 MMHG | SYSTOLIC BLOOD PRESSURE: 112 MMHG

## 2019-07-15 DIAGNOSIS — R10.30: Primary | ICD-10-CM

## 2019-07-15 DIAGNOSIS — F17.200: ICD-10-CM

## 2019-07-15 LAB
ALBUMIN DIAFP-MCNC: 3.9 G/DL (ref 3.2–5.5)
ALBUMIN/GLOB SERPL: 1.1 {RATIO} (ref 1–2.2)
ALP SERPL-CCNC: 77 IU/L (ref 42–121)
ALT SERPL W P-5'-P-CCNC: 18 IU/L (ref 10–60)
ANION GAP SERPL CALCULATED.4IONS-SCNC: 12 MMOL/L (ref 6–13)
AST SERPL W P-5'-P-CCNC: 20 IU/L (ref 10–42)
BASOPHILS NFR BLD AUTO: 0.1 10^3/UL (ref 0–0.1)
BASOPHILS NFR BLD AUTO: 0.7 %
BILIRUB BLD-MCNC: 0.5 MG/DL (ref 0.2–1)
BUN SERPL-MCNC: 12 MG/DL (ref 6–20)
CALCIUM UR-MCNC: 9.1 MG/DL (ref 8.5–10.3)
CHLORIDE SERPL-SCNC: 101 MMOL/L (ref 101–111)
CLARITY UR REFRACT.AUTO: CLEAR
CO2 SERPL-SCNC: 25 MMOL/L (ref 21–32)
CREAT SERPLBLD-SCNC: 0.6 MG/DL (ref 0.4–1)
EOSINOPHIL # BLD AUTO: 0.1 10^3/UL (ref 0–0.7)
EOSINOPHIL NFR BLD AUTO: 1 %
ERYTHROCYTE [DISTWIDTH] IN BLOOD BY AUTOMATED COUNT: 12.7 % (ref 12–15)
GFRSERPLBLD MDRD-ARVRAT: 124 ML/MIN/{1.73_M2} (ref 89–?)
GLOBULIN SER-MCNC: 3.7 G/DL (ref 2.1–4.2)
GLUCOSE SERPL-MCNC: 74 MG/DL (ref 70–100)
GLUCOSE UR QL STRIP.AUTO: NEGATIVE MG/DL
HCG UR QL: NEGATIVE
HGB UR QL STRIP: 12.7 G/DL (ref 12–16)
KETONES UR QL STRIP.AUTO: NEGATIVE MG/DL
LIPASE SERPL-CCNC: 42 U/L (ref 22–51)
LYMPHOCYTES # SPEC AUTO: 3 10^3/UL (ref 1.5–3.5)
LYMPHOCYTES NFR BLD AUTO: 34.3 %
MCH RBC QN AUTO: 31.8 PG (ref 27–31)
MCHC RBC AUTO-ENTMCNC: 32.4 G/DL (ref 32–36)
MCV RBC AUTO: 98 FL (ref 81–99)
MONOCYTES # BLD AUTO: 0.8 10^3/UL (ref 0–1)
MONOCYTES NFR BLD AUTO: 8.9 %
NEUTROPHILS # BLD AUTO: 4.8 10^3/UL (ref 1.5–6.6)
NEUTROPHILS # SNV AUTO: 8.8 X10^3/UL (ref 4.8–10.8)
NEUTROPHILS NFR BLD AUTO: 55 %
NITRITE UR QL STRIP.AUTO: NEGATIVE
PDW BLD AUTO: 9.8 FL (ref 7.9–10.8)
PH UR STRIP.AUTO: 6 PH (ref 5–7.5)
PLATELET # BLD: 285 10^3/UL (ref 130–450)
PROT SPEC-MCNC: 7.6 G/DL (ref 6.7–8.2)
PROT UR STRIP.AUTO-MCNC: NEGATIVE MG/DL
RBC # UR STRIP.AUTO: NEGATIVE /UL
RBC MAR: 4 10^6/UL (ref 4.2–5.4)
SODIUM SERPLBLD-SCNC: 138 MMOL/L (ref 135–145)
SP GR UR STRIP.AUTO: 1.02 (ref 1–1.03)
SQUAMOUS URNS QL MICRO: (no result)
UROBILINOGEN UR QL STRIP.AUTO: (no result) E.U./DL
UROBILINOGEN UR STRIP.AUTO-MCNC: NEGATIVE MG/DL

## 2019-07-15 PROCEDURE — 81003 URINALYSIS AUTO W/O SCOPE: CPT

## 2019-07-15 PROCEDURE — 96372 THER/PROPH/DIAG INJ SC/IM: CPT

## 2019-07-15 PROCEDURE — 87086 URINE CULTURE/COLONY COUNT: CPT

## 2019-07-15 PROCEDURE — 81025 URINE PREGNANCY TEST: CPT

## 2019-07-15 PROCEDURE — 81001 URINALYSIS AUTO W/SCOPE: CPT

## 2019-07-15 PROCEDURE — 85025 COMPLETE CBC W/AUTO DIFF WBC: CPT

## 2019-07-15 PROCEDURE — 83690 ASSAY OF LIPASE: CPT

## 2019-07-15 PROCEDURE — 36415 COLL VENOUS BLD VENIPUNCTURE: CPT

## 2019-07-15 PROCEDURE — 74176 CT ABD & PELVIS W/O CONTRAST: CPT

## 2019-07-15 PROCEDURE — 80053 COMPREHEN METABOLIC PANEL: CPT

## 2019-07-15 PROCEDURE — 99284 EMERGENCY DEPT VISIT MOD MDM: CPT

## 2019-07-15 NOTE — ED PHYSICIAN DOCUMENTATION
PD HPI ABD PAIN





- Stated complaint


Stated Complaint: ABD PAIN N/V





- Chief complaint


Chief Complaint: Abd Pain





- History obtained from


History obtained from: Patient





- History of Present Illness


Timing - onset: Other (April 29, 2019)


Timing - details: Still present, Waxing and waning


Pain level now: 7


Quality: Pain


Location: Other (across lower abdomen)


Radiation: Other (does not radiate)


Improved by: Other (nothing)


Worsened by: Other (no apparent exacerbating factors)


Associated symptoms: Nausea, Vomiting.  No: Fever, Diarrhea, Constipation


Similar symptoms before: Other (see below; possible endometriosis)





- Additional information


Additional information: 





patient says she has had symptoms since April 29th: abdominal pain, nausea, 

vomiting, dizzy/lightheaded. Per patient, "I've already been seen by ob/gyn and 

other doctors for this"; she indicates one of the ob/gyn she saw suspected 

endometriosis "but they didn't do any tests for it". Patient had been on 

deployment and thus all of these previous evaluations were while on deployment. 

She says she returned from deployment 2 days ago "because of this problem". 

Tonight, a friend advised her to go to the emergency department for evaluation 

for these ongoing symptoms. She says she has been on various medications for 

these symptoms, but cannot recall specific medications except doxycycline and 

ibuprofen; she also remarks that "tylenol doesn't work on this pain". 





Review of Systems


Constitutional: denies: Fever, Chills, Sweats


Cardiac: reports: Reviewed and negative


Respiratory: reports: Reviewed and negative


GI: reports: Abdominal Pain, Nausea, Vomiting.  denies: Constipation, Diarrhea


: denies: Dysuria, Frequency


Musculoskeletal: reports: Reviewed and negative


Neurologic: reports: Reviewed and negative





PD PAST MEDICAL HISTORY





- Past Medical History


Cardiovascular: None


Respiratory: None


Neuro: None


Endocrine/Autoimmune: None


GI: None


GYN: None


: None


HEENT: None


Psych: None


Musculoskeletal: Other


Derm: None





- Past Surgical History


Past Surgical History: Yes





- Present Medications


Home Medications: 


                                Ambulatory Orders











 Medication  Instructions  Recorded  Confirmed


 


No Known Home Medications  07/15/19 07/15/19














- Allergies


Allergies/Adverse Reactions: 


                                    Allergies











Allergy/AdvReac Type Severity Reaction Status Date / Time


 


No Known Drug Allergies Allergy   Verified 07/15/19 20:34














- Social History


Does the pt smoke?: Yes


Smoking Status: Current some day smoker


Does the pt drink ETOH?: Yes


Does the pt have substance abuse?: No





- Immunizations


Immunizations are current?: Yes





- POLST


Patient has POLST: No





PD ED PE NORMAL





- Vitals


Vital signs reviewed: Yes





- General


General: Alert and oriented X 3, No acute distress, Well developed/nourished





- HEENT


HEENT: PERRL, EOMI, Moist mucous membranes





- Neck


Neck: Supple, no meningeal sign





- Cardiac


Cardiac: RRR, No murmur





- Respiratory


Respiratory: No respiratory distress, Clear bilaterally





- Abdomen


Abdomen: Soft, Other (mild tenderness across lower abdomen without rebound or 

guarding)





- Back


Back: No CVA TTP





- Derm


Derm: Normal color, Warm and dry





Results





- Vitals


Vitals: 


                               Vital Signs - 24 hr











  07/15/19 07/15/19





  20:29 22:39


 


Temperature 36.1 C L 


 


Heart Rate 89 83


 


Respiratory 16 16





Rate  


 


Blood Pressure 144/81 H 112/77


 


O2 Saturation 100 99








                                     Oxygen











O2 Source                      Room air

















- Labs


Labs: 


                                Laboratory Tests











  07/15/19 07/15/19 07/15/19





  20:45 20:49 20:49


 


WBC   8.8 


 


RBC   4.00 L 


 


Hgb   12.7 


 


Hct   39.2 


 


MCV   98.0 


 


MCH   31.8 H 


 


MCHC   32.4 


 


RDW   12.7 


 


Plt Count   285 


 


MPV   9.8 


 


Neut # (Auto)   4.8 


 


Lymph # (Auto)   3.0 


 


Mono # (Auto)   0.8 


 


Eos # (Auto)   0.1 


 


Baso # (Auto)   0.1 


 


Absolute Nucleated RBC   0.00 


 


Nucleated RBC %   0.0 


 


Sodium    138


 


Potassium    3.6


 


Chloride    101


 


Carbon Dioxide    25


 


Anion Gap    12.0


 


BUN    12


 


Creatinine    0.6


 


Estimated GFR (MDRD)    124


 


Glucose    74


 


Calcium    9.1


 


Total Bilirubin    0.5


 


AST    20


 


ALT    18


 


Alkaline Phosphatase    77


 


Total Protein    7.6


 


Albumin    3.9


 


Globulin    3.7


 


Albumin/Globulin Ratio    1.1


 


Lipase    42


 


Urine Color  YELLOW  


 


Urine Clarity  CLEAR  


 


Urine pH  6.0  


 


Ur Specific Gravity  1.020  


 


Urine Protein  NEGATIVE  


 


Urine Glucose (UA)  NEGATIVE  


 


Urine Ketones  NEGATIVE  


 


Urine Occult Blood  NEGATIVE  


 


Urine Nitrite  NEGATIVE  


 


Urine Bilirubin  NEGATIVE  


 


Urine Urobilinogen  0.2 (NORMAL)  


 


Ur Leukocyte Esterase  TRACE H  


 


Urine RBC  None Seen  


 


Urine WBC  4-5  


 


Ur Squamous Epith Cells  MANY Squamous H  


 


Urine Bacteria  Few  


 


Ur Microscopic Review  INDICATED  


 


Urine Culture Comments  NOT INDICATED  


 


Urine HCG, Qual  NEGATIVE  














- Rads (name of study)


  ** CT a/p 


Radiology: Prelim report reviewed, See rad report





PD MEDICAL DECISION MAKING





- ED course


Complexity details: reviewed results, re-evaluated patient, considered 

differential, d/w patient


ED course: 





Patient tells me [place] already 

scheduled for tomorrow. Prior to discharge, after I reviewed results of her 

tests with patient, she requests stronger pain medication. She says the toradol 

did not help. I recommended Ultram, and she says she has had Ultram in the past 

without adequate pain relief. Given 2 tablets Vicodin in ED and discharged.





Departure





- Departure


Disposition: 01 Home, Self Care


Clinical Impression: 


 Abdominal pain





Condition: Good


Health Concerns: 


abdominal pain


Plan of Treatment: 


follow up with PCP


Care Goals: 


resolution of symptoms, diagnosis if possible


Assessment: 


see diagnosis


Instructions:  ED Abdominal Pain Unkn Cause, ED Pelvic Pain [facility]


Follow-Up: 


JENNIFER SANTOS MD [Primary Care Provider] - Within 1 week


Discharge Date/Time: 07/15/19 23:42

## 2019-07-15 NOTE — CT REPORT
Reason:  lower abdominal pain

Procedure Date:  07/15/2019   

Accession Number:  [id number] / [id number]                    

Procedure:  CT  - Abdomen/Pelvis WO CPT Code:  

 

FULL RESULT:

 

 

EXAM:

CT ABDOMEN AND PELVIS (CT KUB)

 

EXAM DATE: 7/15/2019 09:47 PM.

 

CLINICAL HISTORY: Lower abdominal pain.

 

COMPARISONS: None.

 

TECHNIQUE: Routine axial helical CT imaging was performed through the 

abdomen and pelvis without IV contrast. Reconstructions: Coronal and 

sagittal.

 

In accordance with CT protocol optimization, one or more of the following 

dose reduction techniques were utilized for this exam: automated exposure 

control, adjustment of mA and/or KV based on patient size, or use of 

iterative reconstructive technique.

 

FINDINGS:

Right Kidney/Ureter: No stones. No hydronephrosis or hydroureter. No 

definite renal mass within the confines of a non-contrast exam.

 

Left Kidney/Ureter: No stones. No hydronephrosis or hydroureter. No 

definite renal mass within the confines of a non-contrast exam.

 

Abdominal Solid Organs: Abdominal parenchymal organs are without 

significant abnormality within the confines of a noncontrast exam.

 

Bowel: No evidence of bowel obstruction.

 

Appendix: Normal.

 

Lymph Nodes: No definite pathologic lymphadenopathy.

 

Fluid: No significant ascites.

 

Vasculature: Normal caliber aorta.

 

Pelvis: No bladder stones. Visualized pelvic organs are without 

significant abnormality within the confines of a noncontrast exam.

 

Bones: No definite suspicious bony lesions demonstrated.

 

Lower Chest: No significant lung base consolidation or effusion.

IMPRESSION:

1. No urinary tract stones or obstruction.

2. There is no bowel obstruction. Appendix is normal.

 

[facility]

## 2019-09-24 ENCOUNTER — HOSPITAL ENCOUNTER (EMERGENCY)
Dept: HOSPITAL 76 - ED | Age: 24
Discharge: HOME | End: 2019-09-24
Payer: COMMERCIAL

## 2019-09-24 VITALS — SYSTOLIC BLOOD PRESSURE: 138 MMHG | DIASTOLIC BLOOD PRESSURE: 82 MMHG

## 2019-09-24 DIAGNOSIS — Z20.2: ICD-10-CM

## 2019-09-24 DIAGNOSIS — F17.200: ICD-10-CM

## 2019-09-24 DIAGNOSIS — Z11.3: Primary | ICD-10-CM

## 2019-09-24 LAB — T VAGINALIS RRNA GENITAL QL PROBE: NEGATIVE

## 2019-09-24 PROCEDURE — 99283 EMERGENCY DEPT VISIT LOW MDM: CPT

## 2019-09-24 PROCEDURE — 87591 N.GONORRHOEAE DNA AMP PROB: CPT

## 2019-09-24 PROCEDURE — 87491 CHLMYD TRACH DNA AMP PROBE: CPT

## 2019-09-24 PROCEDURE — 99281 EMR DPT VST MAYX REQ PHY/QHP: CPT

## 2019-09-24 PROCEDURE — 87661 TRICHOMONAS VAGINALIS AMPLIF: CPT

## 2019-09-24 NOTE — ED PHYSICIAN DOCUMENTATION
History of Present Illness





- Stated complaint


Stated Complaint: FEMALE 





- Chief complaint


Chief Complaint: General





- History obtained from


History obtained from: Patient (Recently had a sexual encounter with a new 

partner.  Presents for STD testing.  She has no symptoms, no pain or discharge. 

No lesions.  [place]





Review of Systems


Constitutional: reports: Reviewed and negative


Cardiac: reports: Reviewed and negative


Respiratory: reports: Reviewed and negative





PD PAST MEDICAL HISTORY





- Past Medical History


Cardiovascular: None


Respiratory: None


Neuro: None


Endocrine/Autoimmune: None


GI: None


GYN: None


: None


HEENT: None


Psych: None


Musculoskeletal: Other


Derm: None





- Past Surgical History


Past Surgical History: Yes





- Present Medications


Home Medications: 


                                Ambulatory Orders











 Medication  Instructions  Recorded  Confirmed


 


Antibiotics   09/24/19 














- Allergies


Allergies/Adverse Reactions: 


                                    Allergies











Allergy/AdvReac Type Severity Reaction Status Date / Time


 


No Known Drug Allergies Allergy   Verified 09/24/19 14:58














- Social History


Does the pt smoke?: Yes


Smoking Status: Current some day smoker


Does the pt drink ETOH?: Yes


Does the pt have substance abuse?: No





- Immunizations


Immunizations are current?: Yes





- POLST


Patient has POLST: No





PD ED PE NORMAL





- Vitals


Vital signs reviewed: Yes





- General


General: Alert and oriented X 3, No acute distress





- Derm


Derm: Normal color, Warm and dry, No rash





- Neuro


Neuro: Alert and oriented X 3





- Psych


Psych: Normal mood, Normal affect





Results





- Vitals


Vitals: 





                               Vital Signs - 24 hr











  09/24/19





  14:55


 


Temperature 36.4 C L


 


Heart Rate 82


 


Respiratory 18





Rate 


 


Blood Pressure 138/82 H


 


O2 Saturation 98








                                     Oxygen











O2 Source                      Room air

















PD MEDICAL DECISION MAKING





- ED course


ED course: 





She declined presumptive treatment for STDs wanting to wait for the results.





Departure





- Departure


Disposition: 01 Home, Self Care


Clinical Impression: 


 Concern about STD in female without diagnosis





Condition: Good


Record reviewed to determine appropriate education?: Yes


Instructions:  STDs Reduce Risk Teen, STD Poss


Comments: 


If your testing is positive we will call you.  Return for new worsening 

symptoms.  [place]